# Patient Record
Sex: MALE | Race: BLACK OR AFRICAN AMERICAN | NOT HISPANIC OR LATINO | URBAN - METROPOLITAN AREA
[De-identification: names, ages, dates, MRNs, and addresses within clinical notes are randomized per-mention and may not be internally consistent; named-entity substitution may affect disease eponyms.]

---

## 2022-12-22 ENCOUNTER — OFFICE VISIT (OUTPATIENT)
Dept: ORTHOPEDICS | Facility: CLINIC | Age: 27
End: 2022-12-22
Payer: COMMERCIAL

## 2022-12-22 ENCOUNTER — HOSPITAL ENCOUNTER (OUTPATIENT)
Dept: RADIOLOGY | Facility: CLINIC | Age: 27
Discharge: HOME | End: 2022-12-22
Attending: ORTHOPAEDIC SURGERY
Payer: COMMERCIAL

## 2022-12-22 VITALS — HEIGHT: 71 IN | BODY MASS INDEX: 31.64 KG/M2 | WEIGHT: 226 LBS

## 2022-12-22 DIAGNOSIS — M79.671 RIGHT FOOT PAIN: ICD-10-CM

## 2022-12-22 DIAGNOSIS — M19.071 PRIMARY OSTEOARTHRITIS OF RIGHT FOOT: ICD-10-CM

## 2022-12-22 DIAGNOSIS — M21.40 PES PLANUS, UNSPECIFIED LATERALITY: ICD-10-CM

## 2022-12-22 DIAGNOSIS — M79.671 RIGHT FOOT PAIN: Primary | ICD-10-CM

## 2022-12-22 PROCEDURE — 99203 PR OFFICE/OUTPT VISIT, NEW, LEVL III, 30-44 MIN: ICD-10-PCS | Mod: ,,, | Performed by: ORTHOPAEDIC SURGERY

## 2022-12-22 PROCEDURE — 73630 XR FOOT COMPLETE 3 VIEW RIGHT: ICD-10-PCS | Mod: RT,,, | Performed by: ORTHOPAEDIC SURGERY

## 2022-12-22 PROCEDURE — 3008F PR BODY MASS INDEX (BMI) DOCUMENTED: ICD-10-PCS | Mod: CPTII,,, | Performed by: ORTHOPAEDIC SURGERY

## 2022-12-22 PROCEDURE — 99203 OFFICE O/P NEW LOW 30 MIN: CPT | Mod: ,,, | Performed by: ORTHOPAEDIC SURGERY

## 2022-12-22 PROCEDURE — 1160F RVW MEDS BY RX/DR IN RCRD: CPT | Mod: CPTII,,, | Performed by: ORTHOPAEDIC SURGERY

## 2022-12-22 PROCEDURE — 3008F BODY MASS INDEX DOCD: CPT | Mod: CPTII,,, | Performed by: ORTHOPAEDIC SURGERY

## 2022-12-22 PROCEDURE — 73630 X-RAY EXAM OF FOOT: CPT | Mod: RT,,, | Performed by: ORTHOPAEDIC SURGERY

## 2022-12-22 PROCEDURE — 1159F MED LIST DOCD IN RCRD: CPT | Mod: CPTII,,, | Performed by: ORTHOPAEDIC SURGERY

## 2022-12-22 PROCEDURE — 1159F PR MEDICATION LIST DOCUMENTED IN MEDICAL RECORD: ICD-10-PCS | Mod: CPTII,,, | Performed by: ORTHOPAEDIC SURGERY

## 2022-12-22 PROCEDURE — 1160F PR REVIEW ALL MEDS BY PRESCRIBER/CLIN PHARMACIST DOCUMENTED: ICD-10-PCS | Mod: CPTII,,, | Performed by: ORTHOPAEDIC SURGERY

## 2022-12-22 NOTE — PROGRESS NOTES
"Subjective:    CC: Foot Pain (right foot pain. started in 2017. complaints of slight pain but no swelling. no radiation of pain. not taking any meds. )       HPI:  For his 1st visit.  Patient complains of right foot pain, he states it started 5 years ago.  After not wearing appropriate shoes, standing all day on concrete.  Since then he has been having intermittent pain in his feet.  Patient states getting some good shoes has helped.  He denies any specific numbness or tingling he is not taking any pain medications.  He denies other complaints.    ROS: Refer to HPI for pertinent ROS. All other 12 point systems negative.    Objective:  Vitals:    12/22/22 1529   Weight: 102.5 kg (226 lb)   Height: 5' 11" (1.803 m)        Physical Exam:  Patient is well-nourished developed male he is awake alert and oriented x3 he is an apparent stress is pleasant and cooperative.  Examination of the right lower extremity compartment soft and warm.  Skin is intact.  There is no signs or symptoms of DVT or infection.  He does have a flatfoot deformity, he does have correct ability, he does have a over pronated midfoot as well.  He is nontender along the plantar fascia, there is no nodules.  He is otherwise stable to stressing has appropriate motion otherwise, neurovascular intact distally.    Images:  X-rays three views of the right foot demonstrate midfoot mild arthritic changes otherwise no obvious fracture or dislocation. Images Reviewed and discussed with patient.    Assessment:  1. Right foot pain  - X-Ray Foot Complete Right; Future    2. Pes planus, unspecified laterality  - HME - OTHER    3. Primary osteoarthritis of right foot  - HME - OTHER        Plan:  At this time we discussed his physical exam and x-ray findings.  We have discussed anti-inflammatories with appropriate precautions, appropriate shoe wear, we have discussed a custom molded shoe inserts, arch support.  We will try this 1st.  I would like to see him back with " any problems or difficulties.    Follow UP: No follow-ups on file.